# Patient Record
Sex: FEMALE | NOT HISPANIC OR LATINO | URBAN - METROPOLITAN AREA
[De-identification: names, ages, dates, MRNs, and addresses within clinical notes are randomized per-mention and may not be internally consistent; named-entity substitution may affect disease eponyms.]

---

## 2019-12-26 ENCOUNTER — OFFICE VISIT CONVERTED (OUTPATIENT)
Dept: NEUROSURGERY | Facility: CLINIC | Age: 79
End: 2019-12-26
Attending: NEUROLOGICAL SURGERY

## 2020-01-20 ENCOUNTER — HOSPITAL ENCOUNTER (OUTPATIENT)
Dept: PREADMISSION TESTING | Facility: HOSPITAL | Age: 80
Discharge: HOME | End: 2020-01-20
Attending: NEUROLOGICAL SURGERY

## 2020-01-20 LAB
ANION GAP SERPL CALC-SCNC: 16 MMOL/L (ref 8–19)
APPEARANCE UR: ABNORMAL
BILIRUB UR QL: NEGATIVE
BUN SERPL-MCNC: 13 MG/DL (ref 5–25)
BUN/CREAT SERPL: 25 {RATIO} (ref 6–20)
CALCIUM SERPL-MCNC: 9.6 MG/DL (ref 8.7–10.4)
CHLORIDE SERPL-SCNC: 102 MMOL/L (ref 99–111)
COLOR UR: ABNORMAL
CONV BACTERIA: NEGATIVE
CONV CO2: 24 MMOL/L (ref 22–32)
CONV COLLECTION SOURCE (UA): ABNORMAL
CONV HYALINE CASTS IN URINE MICRO: ABNORMAL /[LPF]
CONV UROBILINOGEN IN URINE BY AUTOMATED TEST STRIP: 1 {EHRLICHU}/DL (ref 0.1–1)
CREAT UR-MCNC: 0.52 MG/DL (ref 0.5–0.9)
GFR SERPLBLD BASED ON 1.73 SQ M-ARVRAT: >60 ML/MIN/{1.73_M2}
GLUCOSE SERPL-MCNC: 126 MG/DL (ref 65–99)
GLUCOSE UR QL: NEGATIVE MG/DL
HGB UR QL STRIP: NEGATIVE
KETONES UR QL STRIP: NEGATIVE MG/DL
LEUKOCYTE ESTERASE UR QL STRIP: ABNORMAL
NITRITE UR QL STRIP: NEGATIVE
OSMOLALITY SERPL CALC.SUM OF ELEC: 288 MOSM/KG (ref 273–304)
PH UR STRIP.AUTO: 5 [PH] (ref 5–8)
POTASSIUM SERPL-SCNC: 4 MMOL/L (ref 3.5–5.3)
PROT UR QL: NEGATIVE MG/DL
RBC #/AREA URNS HPF: ABNORMAL /[HPF]
SODIUM SERPL-SCNC: 138 MMOL/L (ref 135–147)
SP GR UR: 1.02 (ref 1–1.03)
WBC #/AREA URNS HPF: ABNORMAL /[HPF]

## 2020-03-03 ENCOUNTER — OFFICE VISIT CONVERTED (OUTPATIENT)
Dept: NEUROSURGERY | Facility: CLINIC | Age: 80
End: 2020-03-03
Attending: NEUROLOGICAL SURGERY

## 2020-08-03 ENCOUNTER — HOSPITAL ENCOUNTER (OUTPATIENT)
Dept: GENERAL RADIOLOGY | Facility: HOSPITAL | Age: 80
Discharge: HOME | End: 2020-08-03
Attending: NEUROLOGICAL SURGERY

## 2020-08-03 LAB
CREAT BLD-MCNC: 0.7 MG/DL (ref 0.6–1.4)
GFR SERPLBLD BASED ON 1.73 SQ M-ARVRAT: >60 ML/MIN/{1.73_M2}

## 2020-08-06 ENCOUNTER — OFFICE VISIT CONVERTED (OUTPATIENT)
Dept: NEUROSURGERY | Facility: CLINIC | Age: 80
End: 2020-08-06
Attending: NEUROLOGICAL SURGERY

## 2020-11-20 ENCOUNTER — HOSPITAL ENCOUNTER (OUTPATIENT)
Dept: GASTROENTEROLOGY | Facility: HOSPITAL | Age: 80
Setting detail: HOSPITAL OUTPATIENT SURGERY
Discharge: HOME | End: 2020-11-20
Attending: INTERNAL MEDICINE

## 2020-11-20 LAB — GLUCOSE BLD-MCNC: 139 MG/DL (ref 65–99)

## 2021-02-09 ENCOUNTER — HOSPITAL ENCOUNTER (OUTPATIENT)
Dept: GENERAL RADIOLOGY | Facility: HOSPITAL | Age: 81
Discharge: HOME | End: 2021-02-09
Attending: NEUROLOGICAL SURGERY

## 2021-02-09 ENCOUNTER — OFFICE VISIT CONVERTED (OUTPATIENT)
Dept: NEUROSURGERY | Facility: CLINIC | Age: 81
End: 2021-02-09
Attending: NEUROLOGICAL SURGERY

## 2021-02-09 LAB
CREAT BLD-MCNC: 1 MG/DL (ref 0.6–1.4)
GFR SERPLBLD BASED ON 1.73 SQ M-ARVRAT: 53 ML/MIN/{1.73_M2}

## 2021-05-13 NOTE — PROGRESS NOTES
Progress Note      Patient Name: Amy Fam   Patient ID: [id number]   Sex: Female   YOB: 1940    Referring Provider: Holly CHRISTIANSON    Visit Date: August 6, 2020    Provider: Nikunj Macdonald MD   Location: [facility]   Location Address: [address] [place] [address]  [id number]   Location Phone: [phone]          Chief Complaint     Here to discuss new MRI results.       History Of Present Illness     She is overall improved, but remains unsteady. She is having increased swelling in the feet at night. She has some incontinence at night. She would like to have her PEG tube removed.       Past Medical History  Benign neoplasm of cerebral meninges; Cancer; Diabetes mellitus, insulin dependent (IDDM), controlled; Hyperlipidemia; Hypertension, Benign Essential         Past Surgical History  Colon Surgery; Craniotomy         Medication List  amlodipine 5 mg oral tablet; brimonidine 0.2 % ophthalmic (eye) drops; ferrous gluconate 324 mg (37.5 mg iron) oral tablet; gentamicin 0.3 % (3 mg/gram) ophthalmic (eye) ointment; loratadine 10 mg oral capsule; losartan 100 mg oral tablet; metformin 500 mg oral tablet; omeprazole 20 mg oral capsule,delayed release(DR/EC); pravastatin 20 mg oral tablet; timolol maleate 0.5 % ophthalmic (eye) drops; Vitamin B-12 5,000 mcg/mL sublingual drops         Allergy List  PENICILLINS       Allergies Reconciled  Family Medical History  Family history of cancer; Family history of diabetes mellitus         Social History  Alcohol (Never); lives alone; Retired; Tobacco (Former);          Review of Systems  · Constitutional  o Admits  o : fatigue  o Denies  o : chills, excessive sweating, fever, sycope/passing out, weight gain, weight loss  · Eyes  o Denies  o : changes in vision, blurry vision, double vision  · HENT  o Admits  o : loss of hearing  o Denies  o : ringing in the ears, ear aches, sore throat, nasal congestion, sinus pain, nose  "bleeds, seasonal allergies  · Cardiovascular  o Admits  o : swollen legs  o Denies  o : blood clots, anemia, easy burising or bleeding, transfusions  · Respiratory  o Denies  o : shortness of breath, dry cough, productive cough, pneumonia, COPD  · Gastrointestinal  o Denies  o : difficulty swallowing, reflux  · Genitourinary  o Denies  o : incontinence  · Neurologic  o Denies  o : headache, seizure, stroke, tremor, loss of balance, falls, dizziness/vertigo, difficulty with sleep, numbness/tingling/paresthesia , difficulty with coordination, difficulty with dexterity, weakness  · Musculoskeletal  o Admits  o : muscle aches, weakness  o Denies  o : neck stiffness/pain, swollen lymph nodes, joint pain, spasms, sciatica, pain radiating in arm, pain radiating in leg, low back pain  · Endocrine  o Admits  o : diabetes  o Denies  o : thyroid disorder  · Psychiatric  o Denies  o : anxiety, depression  · All Others Negative      Vitals  Date Time BP Position Site L\R Cuff Size HR RR TEMP (F) WT  HT  BMI kg/m2 BSA m2 O2 Sat HC       08/06/2020 01:45 PM        97.6 119lbs 9oz 5'  2\" 21.87 1.54           Physical Examination     She has no drift.  No speech difficulty.  Some confusion with complex commands.  Naming 3 of 3 normal.               Assessment  · Benign neoplasm of cerebral meninges     225.2/D32.0  S/P resection, some residual stable in size      Plan  · Orders  o MRI brain with and without contrast (29143) - - 02/06/2021  · Medications  o Medications have been Reconciled  o Transition of Care or Provider Policy  · Instructions  o Repeat MRI in 6 or so months to assure stability.  o We will refer to Dr. Gilmore to remove G-tube placed in hospital.  o She is not yet ready to live independently. If she were to go home, she would need someone to be with her 24 hours/day.  · Disposition  o Call or Return if symptoms worsen or persist.  o Return Visit Request in/on 6 months +/- 2 days (16428).  · Associate Tasks  o Task " ID [id number] ''Front to Nurse: Please refer to Dr. Gilmore for G-Tube removal.            Electronically Signed by: Nikunj Macdonald MD -Author on August 6, 2020 02:41:25 PM

## 2021-05-14 VITALS — BODY MASS INDEX: 24.48 KG/M2 | TEMPERATURE: 97.4 F | WEIGHT: 133 LBS | HEIGHT: 62 IN

## 2021-05-14 NOTE — PROGRESS NOTES
Progress Note      Patient Name: Amy Fam   Patient ID: [id number]   Sex: Female   YOB: 1940    Referring Provider: Holly CHRISTIANSON    Visit Date: February 9, 2021    Provider: Nikunj Macdonald MD   [place] [facility]   Location Address: [address] [place] [address]  [id number]   Location Phone: [phone]          Chief Complaint     Here to discuss MRI results done at [facility].       History Of Present Illness     She is overall about the same. She had her G-Tube removed. She had her new MRI today. The MRI  shows the meningioma grew about 5 mm in its greatest dimension. She is having some bleeding in the eye.       Past Medical History  Benign neoplasm of cerebral meninges; Cancer; Diabetes mellitus, insulin dependent (IDDM), controlled; Hyperlipidemia; Hypertension, Benign Essential         Past Surgical History  Colon Surgery; Craniotomy         Medication List  amlodipine 5 mg oral tablet; brimonidine 0.2 % ophthalmic (eye) drops; ferrous gluconate 324 mg (37.5 mg iron) oral tablet; gentamicin 0.3 % (3 mg/gram) ophthalmic (eye) ointment; loratadine 10 mg oral capsule; losartan 100 mg oral tablet; metformin 500 mg oral tablet; omeprazole 20 mg oral capsule,delayed release(DR/EC); pravastatin 20 mg oral tablet; timolol maleate 0.5 % ophthalmic (eye) drops; Vitamin B-12 5,000 mcg/mL sublingual drops         Allergy List  PENICILLINS       Allergies Reconciled  Family Medical History  Family history of cancer; Family history of diabetes mellitus         Social History  Alcohol (Never); lives alone; Retired; Tobacco (Former);          Review of Systems  · Constitutional  o Denies  o : chills, excessive sweating, fatigue, fever, sycope/passing out, weight gain, weight loss  · Eyes  o Denies  o : changes in vision, blurry vision, double vision  · HENT  o Denies  o : loss of hearing, ringing in the ears, ear aches, sore throat, nasal congestion, sinus  "pain, nose bleeds, seasonal allergies  · Cardiovascular  o Denies  o : blood clots, swollen legs, anemia, easy burising or bleeding, transfusions  · Respiratory  o Denies  o : shortness of breath, dry cough, productive cough, pneumonia, COPD  · Gastrointestinal  o Denies  o : difficulty swallowing, reflux  · Genitourinary  o Denies  o : incontinence  · Neurologic  o Denies  o : headache, seizure, stroke, tremor, loss of balance, falls, dizziness/vertigo, difficulty with sleep, numbness/tingling/paresthesia , difficulty with coordination, difficulty with dexterity, weakness  · Musculoskeletal  o Denies  o : neck stiffness/pain, swollen lymph nodes, muscle aches, joint pain, weakness, spasms, sciatica, pain radiating in arm, pain radiating in leg, low back pain  · Endocrine  o Denies  o : diabetes, thyroid disorder  · Psychiatric  o Denies  o : anxiety, depression  · All Others Negative      Vitals  Date Time BP Position Site L\R Cuff Size HR RR TEMP (F) WT  HT  BMI kg/m2 BSA m2 O2 Sat FR L/min FiO2 HC       02/09/2021 02:46 PM        97.4 133lbs 0oz 5'  2\" 24.33 1.62             Physical Examination     Speech fluent  [place]               Assessment  · Benign neoplasm of cerebral meninges     225.2/D32.0  S/P resection, residual tumor with some growth (appears to be an aggressive meningioma).      Plan  · Medications  o Medications have been Reconciled  o Transition of Care or Provider Policy  · Instructions  o They will discuss f/u as a family (repeat MRI in 6/12/never) and let us know how to proceed.            Electronically Signed by: Nikunj Macdonald MD -Author on February 9, 2021 03:11:22 PM  "

## 2021-05-15 VITALS — DIASTOLIC BLOOD PRESSURE: 42 MMHG | SYSTOLIC BLOOD PRESSURE: 110 MMHG | HEART RATE: 101 BPM | HEIGHT: 62 IN

## 2021-05-15 VITALS — BODY MASS INDEX: 22 KG/M2 | TEMPERATURE: 97.6 F | WEIGHT: 119.56 LBS | HEIGHT: 62 IN
